# Patient Record
(demographics unavailable — no encounter records)

---

## 2025-02-27 NOTE — PHYSICAL EXAM
[Tired appearing] : tired appearing [NL] : warm, clear [de-identified] : Neg valerio no photophobia  [de-identified] : Tight heels and hams Pes planus

## 2025-02-27 NOTE — HISTORY OF PRESENT ILLNESS
[de-identified] : Fever, Bodyaches [FreeTextEntry6] : There has been a couple of days of  and loose BM'S without high fever irritability or lethargy.  The bowel movements are not bloody or mucous.  No vomit some nausea   No SH high risk travel or animals   Saw urology for Primary enuresis   HA no miningismus  Expectant care. Follow up as needed for fever trend, new, or worsening symptoms.

## 2025-02-27 NOTE — DISCUSSION/SUMMARY
[FreeTextEntry1] : Expectant care. Follow up as needed for fever trend, new, or worsening symptoms. Recommend supportive care including benefits and limitations and potential risks of antipyretics, fluids, and nasal saline followed by nasal suction. Discussed risks/benefits of Tamifl  Has not tied the Miralax yet I would like see the MRI and CT And the urologyu note If Miralax does not help consider MRI It sounds like he tried DDAVP and other supportive modalities  Bed alarm did not help   Migrains a afew times a week w/o abd pains FH Aunt Breast cancer

## 2025-03-27 NOTE — DISCUSSION/SUMMARY
[FreeTextEntry1] : Migraine: Think Accuypunture  New sx: Mom perefers RVP  [facility] ordered UA when well first AM for proteinuria

## 2025-03-27 NOTE — HISTORY OF PRESENT ILLNESS
[de-identified] : Bodyaches [FreeTextEntry6] : Has constipation on and off since infancy  His diet is limited and poor quality   No Fever and bodyaches today  No Vomit or diarrhea   +nausea

## 2025-03-27 NOTE — HISTORY OF PRESENT ILLNESS
[de-identified] : Bodyaches [FreeTextEntry6] : Has constipation on and off since infancy  His diet is limited and poor quality   No Fever and bodyaches today  No Vomit or diarrhea   +nausea

## 2025-03-27 NOTE — DISCUSSION/SUMMARY
[FreeTextEntry1] : Migraine: Think Accuypunture  New sx: Mom perefers RVP  [facility] UA when well first AM for proteinuria